# Patient Record
Sex: FEMALE | Race: WHITE
[De-identification: names, ages, dates, MRNs, and addresses within clinical notes are randomized per-mention and may not be internally consistent; named-entity substitution may affect disease eponyms.]

---

## 2017-05-08 ENCOUNTER — HOSPITAL ENCOUNTER (OUTPATIENT)
Dept: HOSPITAL 75 - RAD | Age: 42
End: 2017-05-08
Attending: UROLOGY
Payer: COMMERCIAL

## 2017-05-08 DIAGNOSIS — N20.1: Primary | ICD-10-CM

## 2017-05-08 PROCEDURE — 74000: CPT

## 2017-05-08 NOTE — DIAGNOSTIC IMAGING REPORT
INDICATION: Left ureteral calculus.



EXAM: KUB at 2:52 PM



FINDINGS:  

There is a moderate amount of gas and stool in the GI tract

obscuring evaluation for the presence of a renal or ureteral

calculus. No definite calculus is seen.



IMPRESSION:



Negative abdomen.



Dictated by: 



  Dictated on workstation # [id number]

## 2017-05-19 ENCOUNTER — HOSPITAL ENCOUNTER (OUTPATIENT)
Dept: HOSPITAL 75 - PREOP | Age: 42
End: 2017-05-19
Attending: UROLOGY
Payer: COMMERCIAL

## 2017-05-19 VITALS — WEIGHT: 207 LBS | BODY MASS INDEX: 33.27 KG/M2 | HEIGHT: 66 IN

## 2017-05-19 DIAGNOSIS — Z01.818: Primary | ICD-10-CM

## 2017-05-19 DIAGNOSIS — N20.1: ICD-10-CM

## 2017-05-23 ENCOUNTER — HOSPITAL ENCOUNTER (OUTPATIENT)
Dept: HOSPITAL 75 - SDC | Age: 42
Discharge: HOME | End: 2017-05-23
Attending: UROLOGY
Payer: COMMERCIAL

## 2017-05-23 VITALS — DIASTOLIC BLOOD PRESSURE: 89 MMHG | SYSTOLIC BLOOD PRESSURE: 138 MMHG

## 2017-05-23 VITALS — SYSTOLIC BLOOD PRESSURE: 143 MMHG | DIASTOLIC BLOOD PRESSURE: 77 MMHG

## 2017-05-23 VITALS — DIASTOLIC BLOOD PRESSURE: 80 MMHG | SYSTOLIC BLOOD PRESSURE: 127 MMHG

## 2017-05-23 VITALS — HEIGHT: 66 IN | WEIGHT: 207 LBS | BODY MASS INDEX: 33.27 KG/M2

## 2017-05-23 VITALS — DIASTOLIC BLOOD PRESSURE: 80 MMHG | SYSTOLIC BLOOD PRESSURE: 130 MMHG

## 2017-05-23 DIAGNOSIS — N20.2: Primary | ICD-10-CM

## 2017-05-23 DIAGNOSIS — Z79.899: ICD-10-CM

## 2017-05-23 DIAGNOSIS — N81.6: ICD-10-CM

## 2017-05-23 DIAGNOSIS — E03.9: ICD-10-CM

## 2017-05-23 DIAGNOSIS — N81.10: ICD-10-CM

## 2017-05-23 PROCEDURE — 36415 COLL VENOUS BLD VENIPUNCTURE: CPT

## 2017-05-23 PROCEDURE — 84703 CHORIONIC GONADOTROPIN ASSAY: CPT

## 2017-05-23 PROCEDURE — 74000: CPT

## 2017-05-23 PROCEDURE — 87081 CULTURE SCREEN ONLY: CPT

## 2017-05-23 NOTE — OPERATIVE REPORT
DATE OF SERVICE:  05/23/2017



PREOPERATIVE DIAGNOSIS:

Left distal ureteral stone.



POSTOPERATIVE DIAGNOSES:

1.  Left distal ureteral stone.

2.  Cystocele.



OPERATIONS PERFORMED:

1.  Cystoscopy.

2.  Left ureteroscopy with stone basket.

3.  Insertion of left double J stent.

4.  Retrograde urogram.

5.  Left extracorporeal shock wave lithotripsy.



SURGEON:

Elias Tawil, MD



ANESTHESIA:

General.



COMPLICATIONS:

None.



PROCEDURE IN DETAIL:

Under satisfactory general anesthesia, the patient in lithotomy position on the

cystoscopy table, genitalia were prepped and draped in the usual sterile

fashion.  I noted a 2-3+ cystocele as well as a rectocele.  The cystoscope was

introduced under vision.  The bladder essentially was normal, effluxes, but

sluggish on the left side.  Using the 4 oblique lens, I dilated the left

ureteral orifice to accommodate a 6.9-[place] semi-rigid urethroscope.  However,

I could not manipulate to the level of the stone because of the large cystocele

even by pushing the cystocele inward, so I also injected some contrast.  I could

see the filling defect of the stone.  I went ahead and passed the basket beyond

it.  I could feel it breaking it but could not grab or extract the main

fragment, so I passed a 6-[place] 26 cm double J stent after inserting the

cystoscope, removing the ureteroscope.  It was passed all the way up to the left

renal pelvis, guided fluoroscopically.  The guidewire was removed and the stent

was seen ____nicely proximally fluoroscopically and distally endoscopically. 

The bladder was evacuated and the patient was moved from the ESWL table.  The

stone was localized  and shocks were delivered at kV at 6 for a total of 2000

shocks.  We did not see any more filling defect and the contrast flowing nicely

into the bladder.  The patient received 40 mg of Lasix and 30 mg of Toradol IV

at the end of the procedure.  She tolerated the procedure and anesthesia well

and was sent to recovery room in stable condition.





Job ID: [id number]

DocumentID: [id number]

Dictated Date:  05/23/2017 08:53:43

Transcription Date: 05/23/2017 12:29:47

Dictated By: ELIAS TAWIL, MD

## 2017-05-23 NOTE — DIAGNOSTIC IMAGING REPORT
INDICATION: Post ESWL and stent.



FINDINGS: Left-sided double-J stent projects in its expected

alignment. The left pelvic calcification of 2.3 mm in diameter is

unchanged from the prior and projects just lateral to the distal

stent. No other potential stone.



IMPRESSION: Left pelvic calcification unchanged from prior.

Placement of double-J stent occurred in the interim



Dictated by: 



  Dictated on workstation # [id number]

## 2017-05-23 NOTE — PROGRESS NOTE-PRE OPERATIVE
Pre-Operative Progress Note


H&P Reviewed


The H&P was reviewed, patient examined and no changes noted.


Date H&P Reviewed:  May 23, 2017


Time H&P Reviewed:  07:04


Pre-Operative Diagnosis:  LT URETERAL STONE











TAWIL,ELIAS A MD May 23, 2017 7:04 am

## 2017-05-23 NOTE — PROGRESS NOTE-POST OPERATIVE
Post-Operative Progess Note


Surgeon (s)/Assistant (s)


Surgeon


ELIAS TAWIL MD


Assistant:  N/A





Pre-Operative Diagnosis


LT URETERAL STONE





Post-Operative Diagnosis





SAME





Procedure & Operative Findings


Date of Procedure


5/23/17


Procedure Performed/Findings


LT URETEROSCOPY, STONE BASKET, JJ STENT AND ESWL


Anesthesia Type


GENERAL





Estimated Blood Loss


Estimated blood loss (mL):  N/A





Specimens/Packing


Specimens Removed


N/A











TAWIL,ELIAS A MD May 23, 2017 7:10 am

## 2017-05-23 NOTE — DIAGNOSTIC IMAGING REPORT
KUB.



INDICATION: Left ureteric stone.



FINDINGS:

There is a 2-mm calcification in the left side of the pelvis near

the location of the left ureterovesical junction. This could be a

phlebolith or a stone. Unremarkable bowel gas pattern is seen. No

flank calcifications to suggest kidney stones.



IMPRESSION: Indeterminate 2-mm left pelvic calcification could be

a phlebolith or possibly a left UVJ stone.



Dictated by: 



  Dictated on workstation # [id number]

## 2017-05-31 ENCOUNTER — HOSPITAL ENCOUNTER (OUTPATIENT)
Dept: HOSPITAL 75 - RAD | Age: 42
End: 2017-05-31
Attending: UROLOGY
Payer: COMMERCIAL

## 2017-05-31 DIAGNOSIS — N20.1: Primary | ICD-10-CM

## 2017-05-31 DIAGNOSIS — Z98.890: ICD-10-CM

## 2017-05-31 PROCEDURE — 74000: CPT

## 2017-05-31 NOTE — DIAGNOSTIC IMAGING REPORT
INDICATION: History of renal calculi. Status post recent ESWL.



COMPARISON: 05/23/2017.



FINDINGS: Single frontal radiographic view of the abdomen was

obtained and again demonstrates indwelling left-sided double-J

ureteral stent. Stent appears to be in stable, appropriate

position. Single extraosseous calcification is again noted within

the left hemipelvis and is stable in position. No new

extraosseous calcifications or radiopaque foreign bodies are

seen. Small bowel loops are nondistended. Bony structures show no

gross acute abnormalities.



IMPRESSION:

1. Stable left pelvic calcification.

2. Stable left-sided double-J ureteral stent.



Dictated by: 



  Dictated on workstation # [id number]

## 2018-05-04 ENCOUNTER — HOSPITAL ENCOUNTER (OUTPATIENT)
Dept: HOSPITAL 75 - RAD | Age: 43
End: 2018-05-04
Attending: UROLOGY
Payer: COMMERCIAL

## 2018-05-04 DIAGNOSIS — R31.9: Primary | ICD-10-CM

## 2018-05-04 DIAGNOSIS — Z87.442: ICD-10-CM

## 2018-05-04 DIAGNOSIS — R10.9: ICD-10-CM

## 2018-05-04 PROCEDURE — 74176 CT ABD & PELVIS W/O CONTRAST: CPT

## 2018-05-04 NOTE — DIAGNOSTIC IMAGING REPORT
PROCEDURE: CT urinary tract, rule out kidney stone.



TECHNIQUE: Multiple contiguous axial images were obtained through

the abdomen and pelvis without the use of intravenous contrast.



INDICATION:  Flank pain. Hematuria.



COMPARISON: None



FINDINGS: Included portions of lung bases are clear.



CT abdomen: No renal or ureter calculi seen on either side.

Additionally, there is no hydroureteronephrosis or other evidence

of obstruction. Both kidneys have an unremarkable noncontrast CT

appearance.



The spleen, liver, pancreas, and adrenal glands have an

unremarkable noncontrast CT appearance as well. There is

suggestion of potential 1.7 cm partially calcified splenic artery

aneurysm (image 33, series 2).



Small bowel loops are nondistended. Normal appendix is

identified. There is no loculated fluid collection, free fluid,

nor free air within the abdomen. No abnormal mesenteric or

retroperitoneal adenopathy is seen. Bony structures show no acute

abnormalities.



CT pelvis: Urinary bladder is unremarkable. No calculi are seen

within urinary bladder. There is no loculated fluid collection,

free fluid, nor free air within the pelvis. No abnormal

adenopathy is seen. Bony structures show no acute abnormalities.



IMPRESSION:

1. Unremarkable noncontrast CT of the kidneys in renal collecting

systems.

2. 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

aneurysm. Further evaluation with postcontrast CT of the abdomen

is recommended.



Dictated by: 



  Dictated on workstation # [id number]